# Patient Record
Sex: FEMALE | Race: WHITE | Employment: UNEMPLOYED | URBAN - METROPOLITAN AREA
[De-identification: names, ages, dates, MRNs, and addresses within clinical notes are randomized per-mention and may not be internally consistent; named-entity substitution may affect disease eponyms.]

---

## 2021-11-02 ENCOUNTER — TELEPHONE (OUTPATIENT)
Dept: HEMATOLOGY | Age: 61
End: 2021-11-02

## 2021-11-02 NOTE — TELEPHONE ENCOUNTER
Returned phone call to patient and left message indicating that we do not take [employer] and that they would have to contact the insurer directly to find out who Is in network.

## 2021-11-02 NOTE — TELEPHONE ENCOUNTER
----- Message from Deidre Post sent at 11/1/2021 10:14 AM EDT -----  Regarding: NP Foster/telephone  General Message/Vendor Calls    Caller's first and last name:Jessenia Garciaross      Reason for call:NP. [employer] ins      Callback required yes/no and why:yes to verify if ARIEL Hatch takes [employer] contact number(s):[phone]      Details to clarify the request:NP is requesting a call back to verify if ARIEL Hatch taked [employer]. Pt advised ARIEL Hatch is listed by insurance but guidelines indicate that [facility] does not take it.       Deidre Post